# Patient Record
Sex: MALE | Race: BLACK OR AFRICAN AMERICAN
[De-identification: names, ages, dates, MRNs, and addresses within clinical notes are randomized per-mention and may not be internally consistent; named-entity substitution may affect disease eponyms.]

---

## 2019-08-09 ENCOUNTER — HOSPITAL ENCOUNTER (EMERGENCY)
Dept: HOSPITAL 44 - ED | Age: 34
Discharge: HOME | End: 2019-08-09
Payer: SELF-PAY

## 2019-08-09 VITALS — DIASTOLIC BLOOD PRESSURE: 81 MMHG | SYSTOLIC BLOOD PRESSURE: 127 MMHG
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
## 2019-08-09 DIAGNOSIS — M54.9: Primary | ICD-10-CM
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
## 2019-08-09 DIAGNOSIS — R07.89: ICD-10-CM
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
## 2019-08-09 LAB
APPEARANCE UR: CLEAR
BASOPHILS NFR BLD: 0.5 % (ref 0–1.5)
COLOR,URINE: YELLOW
EGFR (NON-AFRICAN): > 60
MCV RBC AUTO: 94 FL (ref 80–100)
NEUTROPHILS #: 3.5 # K/UL (ref 1.4–7.7)
PH UR STRIP: 5.5 [PH] (ref 5–8)
RBC UR QL: (no result)
UROBILINOGEN URINE: 0.2 EU (ref 0.2–1)

## 2019-08-09 PROCEDURE — 74177 CT ABD & PELVIS W/CONTRAST: CPT

## 2019-08-09 PROCEDURE — 71260 CT THORAX DX C+: CPT

## 2019-08-09 PROCEDURE — 99283 EMERGENCY DEPT VISIT LOW MDM: CPT

## 2019-08-09 PROCEDURE — 80053 COMPREHEN METABOLIC PANEL: CPT

## 2019-08-09 PROCEDURE — 85025 COMPLETE CBC W/AUTO DIFF WBC: CPT

## 2019-08-09 PROCEDURE — 81002 URINALYSIS NONAUTO W/O SCOPE: CPT

## 2019-08-09 PROCEDURE — S1016 NON-PVC INTRAVENOUS ADMINIST: HCPCS

## 2019-08-09 PROCEDURE — 96374 THER/PROPH/DIAG INJ IV PUSH: CPT

## 2019-08-09 PROCEDURE — 96361 HYDRATE IV INFUSION ADD-ON: CPT

## 2019-08-09 PROCEDURE — 72125 CT NECK SPINE W/O DYE: CPT
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
